# Patient Record
(demographics unavailable — no encounter records)

---

## 2025-01-31 NOTE — HISTORY OF PRESENT ILLNESS
[Home] : at home, [unfilled] , at the time of the visit. [Medical Office: ([facility])___] : at the medical office located in  [Verbal consent obtained from patient] : the patient, [unfilled] [FreeTextEntry1] : 63 YO M without significant PMH presents for colon cancer screening.    Patient feels well without GI complaints. GI ROS negative for unintentional weight loss, fevers/chills, dysphagia, reflux, abdominal pain, bloating, nausea, vomiting, early satiety, diarrhea, constipation, melena, hematochezia. Having 2 BMs daily.    Referred by: Dr. Fournier   PMH: denies  PSH: hernia repair, knee surgery, hand surgery  Family history: denies GI malignancy  Allergies: NKDA Medications: denies  Supplements/OTC: MV, fish oil  AC or NSAIDs: denies    Colon Cancer Screenin diverticulosis. one Tubular adenoma 5 mm  EGD: denies  Labs:  cbc, cmp unremarkable  Imaging:  denies    Tobacco: denies  Alcohol: socially Drugs: denies

## 2025-01-31 NOTE — PHYSICAL EXAM
[Alert] : alert [Normal Voice/Communication] : normal voice/communication [No Acute Distress] : no acute distress [Oriented To Time, Place, And Person] : oriented to person, place, and time

## 2025-01-31 NOTE — ASSESSMENT
[FreeTextEntry1] : 65 YO M without significant PMH presents for colon cancer screening. Patient feels well without GI complaints.   #History of Colon Polyps  #Colon Cancer Screening - last cscope 2019 diverticulosis. one Tubular adenoma 5 mm. Repeat in 5 years  - 8/24 cbc, cmp unremarkable  - Schedule patient for colonoscopy at [facility] with Sutab prep. - Discussed R/A/I/B with patient. Education provided on preparation instructions and the need for an escort.  Follow up post procedure